# Patient Record
Sex: MALE | Race: BLACK OR AFRICAN AMERICAN | NOT HISPANIC OR LATINO | Employment: OTHER | URBAN - METROPOLITAN AREA
[De-identification: names, ages, dates, MRNs, and addresses within clinical notes are randomized per-mention and may not be internally consistent; named-entity substitution may affect disease eponyms.]

---

## 2018-05-17 ENCOUNTER — OFFICE VISIT (OUTPATIENT)
Dept: FAMILY MEDICINE | Facility: CLINIC | Age: 58
End: 2018-05-17
Payer: MEDICAID

## 2018-05-17 VITALS
HEART RATE: 73 BPM | RESPIRATION RATE: 16 BRPM | BODY MASS INDEX: 24.11 KG/M2 | WEIGHT: 178 LBS | OXYGEN SATURATION: 98 % | SYSTOLIC BLOOD PRESSURE: 112 MMHG | DIASTOLIC BLOOD PRESSURE: 74 MMHG | HEIGHT: 72 IN

## 2018-05-17 DIAGNOSIS — J30.9 ALLERGIC CONJUNCTIVITIS AND RHINITIS, BILATERAL: Primary | ICD-10-CM

## 2018-05-17 DIAGNOSIS — H10.13 ALLERGIC CONJUNCTIVITIS AND RHINITIS, BILATERAL: Primary | ICD-10-CM

## 2018-05-17 PROCEDURE — 99203 OFFICE O/P NEW LOW 30 MIN: CPT | Performed by: PHYSICIAN ASSISTANT

## 2018-05-17 RX ORDER — FLUTICASONE PROPIONATE 50 MCG
1-2 SPRAY, SUSPENSION (ML) NASAL DAILY
Qty: 1 BOTTLE | Refills: 3 | Status: SHIPPED | OUTPATIENT
Start: 2018-05-17

## 2018-05-17 RX ORDER — CETIRIZINE HYDROCHLORIDE 10 MG/1
10 TABLET ORAL DAILY
COMMUNITY

## 2018-05-17 ASSESSMENT — ENCOUNTER SYMPTOMS
ABDOMINAL PAIN: 0
SHORTNESS OF BREATH: 0
VOMITING: 0
HEADACHES: 0
COUGH: 1
FOCAL WEAKNESS: 0
DIARRHEA: 0
SORE THROAT: 1
EYE REDNESS: 1
CHILLS: 0
FEVER: 0
NAUSEA: 0

## 2018-05-17 NOTE — PROGRESS NOTES
HPI    SUBJECTIVE:   Jose L Poe is a 58 year old male who presents to clinic today for the following health issues:    ALLERGIES      Duration: 2 weeks    Description:   Nasal congestion: YES  Sneezing: YES  Red, itchy eyes: YES    Accompanying signs and symptoms: some cough & sore throat as well    History (similar episodes/allergy testing): Gets this every spring, worse this year    Precipitating or alleviating factors: None    Therapies tried and outcome: Claritin D and Zyrtec.  Help a little      Chart Review:  No flowsheet data found.  No flowsheet data found.    There is no problem list on file for this patient.    History reviewed. No pertinent surgical history.  Family History   Problem Relation Age of Onset     Family history unknown: Yes      Social History   Substance Use Topics     Smoking status: Never Smoker     Smokeless tobacco: Never Used     Alcohol use No        Problem list, Medication list, Allergies, Medical/Social/Surg hx reviewed in [employer], updated as appropriate.      Review of Systems   Constitutional: Negative for chills and fever.   HENT: Positive for congestion and sore throat.         Sneezing   Eyes: Positive for redness (& itching).   Respiratory: Positive for cough. Negative for shortness of breath.    Cardiovascular: Negative for chest pain.   Gastrointestinal: Negative for abdominal pain, diarrhea, nausea and vomiting.   Skin: Negative for rash.   Neurological: Negative for focal weakness and headaches.   All other systems reviewed and are negative.        Physical Exam   Constitutional: He is oriented to person, place, and time and well-developed, well-nourished, and in no distress.   HENT:   Head: Normocephalic and atraumatic.   Right Ear: Tympanic membrane, external ear and ear canal normal.   Left Ear: Tympanic membrane, external ear and ear canal normal.   Nose: Rhinorrhea present.   Mouth/Throat: Uvula is midline, oropharynx is clear and moist and mucous membranes are  "normal.   Eyes: EOM are normal. Pupils are equal, round, and reactive to light. Right conjunctiva is injected. Left conjunctiva is injected.   Cardiovascular: Normal rate, regular rhythm and normal heart sounds.    Pulmonary/Chest: Effort normal and breath sounds normal.   Musculoskeletal: Normal range of motion.   Neurological: He is alert and oriented to person, place, and time. Gait normal.   Skin: Skin is warm and dry.   Nursing note and vitals reviewed.    Vital Signs  /74  Pulse 73  Resp 16  Ht 5' 11.5\" (1.816 m)  Wt 178 lb (80.7 kg)  SpO2 98%  BMI 24.48 kg/m2   Body mass index is 24.48 kg/(m^2).    Diagnostic Test Results:  none     ASSESSMENT/PLAN:                                                        ICD-10-CM    1. Allergic conjunctivitis and rhinitis, bilateral H10.13 fluticasone (FLONASE) 50 MCG/ACT spray    J30.9 ketotifen (ZADITOR/REFRESH ANTI-ITCH) 0.025 % SOLN ophthalmic solution   PERRLA, EOMI. C/w allergies, Rx Flonase & Zaditor. Discontinue Zyrtec & Claritin.    I have discussed any lab or imaging results, the patient's diagnosis, and my plan of treatment with the patient and/or family. Patient is aware to come back in if with worsening symptoms or if no relief despite treatment plan.  Patient voiced understanding and had no further questions.       Follow Up: Data Unavailable    CANDY Emmanuel, PA-C  [facility]              "

## 2018-05-17 NOTE — MR AVS SNAPSHOT
"              After Visit Summary   2018    Jose L Poe    MRN: [id number]           Patient Information     Date Of Birth          1960        Visit Information        Provider Department      2018 8:00 AM Natalie De La Paz PA-C; [employer] [facility] [facility]        Today's Diagnoses     Allergic conjunctivitis and rhinitis, bilateral    -  1       Follow-ups after your visit        Who to contact     If you have questions or need follow up information about today's clinic visit or your schedule please contact [facility] directly at [phone].  Normal or non-critical lab and imaging results will be communicated to you by MyChart, letter or phone within 4 business days after the clinic has received the results. If you do not hear from us within 7 days, please contact the clinic through MyChart or phone. If you have a critical or abnormal lab result, we will notify you by phone as soon as possible.  Submit refill requests through [facility] or call your pharmacy and they will forward the refill request to us. Please allow 3 business days for your refill to be completed.          Additional Information About Your Visit        MyChart Information     [facility] lets you send messages to your doctor, view your test results, renew your prescriptions, schedule appointments and more. To sign up, go to [email or web address] . Click on \"Log in\" on the left side of the screen, which will take you to the Welcome page. Then click on \"Sign up Now\" on the right side of the page.     You will be asked to enter the access code listed below, as well as some personal information. Please follow the directions to create your username and password.     Your access code is: [email or web address]  Expires: 8/15/2018  8:46 AM     Your access code will  in 90 days. If you need help or a new code, please call your [facility] or " "[phone].        Care EveryWhere ID     This is your Care EveryWhere ID. This could be used by other organizations to access your [facility] medical records  [id number]        Your Vitals Were     Pulse Respirations Height Pulse Oximetry BMI (Body Mass Index)       73 16 5' 11.5\" (1.816 m) 98% 24.48 kg/m2        Blood Pressure from Last 3 Encounters:   05/17/18 112/74    Weight from Last 3 Encounters:   05/17/18 178 lb (80.7 kg)              Today, you had the following     No orders found for display         Today's Medication Changes          These changes are accurate as of 5/17/18  8:46 AM.  If you have any questions, ask your nurse or doctor.               Start taking these medicines.        Dose/Directions    fluticasone 50 MCG/ACT spray   Commonly known as:  FLONASE   Used for:  Allergic conjunctivitis and rhinitis, bilateral   Started by:  Natalie De La Paz PA-C        Dose:  1-2 spray   Spray 1-2 sprays into both nostrils daily   Quantity:  1 Bottle   Refills:  3       ketotifen 0.025 % Soln ophthalmic solution   Commonly known as:  ZADITOR/REFRESH ANTI-ITCH   Used for:  Allergic conjunctivitis and rhinitis, bilateral   Started by:  Natalie De La Paz PA-C        Dose:  1 drop   Place 1 drop into both eyes 2 times daily   Quantity:  1 Bottle   Refills:  3            Where to get your medicines      These medications were sent to [facility] [address] [place] [address] AT [address] [address]     Phone:  [phone]     fluticasone 50 MCG/ACT spray    ketotifen 0.025 % Soln ophthalmic solution                Primary Care Provider Office Phone # Fax #    [facility] [phone]       [address] [place] [address], [address]  [facility] [address]        Equal Access to Services     BUBBA WEIR AH: Jose Galeano, rosa bragg, joselo ray, " joselyn fajardorosibel sweeney'aan ah. So [facility] [phone].    ATENCIÓN: Si lesliela gamal, tiene a becerra disposición servicios gratuitos de asistencia lingüística. Marcel loera [phone].    We comply with applicable federal civil rights laws and [place] laws. We do not discriminate on the basis of race, color, national origin, age, disability, sex, sexual orientation, or gender identity.            Thank you!     Thank you for choosing [facility]  for your care. Our goal is always to provide you with excellent care. Hearing back from our patients is one way we can continue to improve our services. Please take a few minutes to complete the written survey that you may receive in the mail after your visit with us. Thank you!             Your Updated Medication List - Protect others around you: Learn how to safely use, store and throw away your medicines at www.disposemymeds.org.          This list is accurate as of 5/17/18  8:46 AM.  Always use your most recent med list.                   Brand Name Dispense Instructions for use Diagnosis    cetirizine 10 MG tablet    zyrTEC     Take 10 mg by mouth daily        fluticasone 50 MCG/ACT spray    FLONASE    1 Bottle    Spray 1-2 sprays into both nostrils daily    Allergic conjunctivitis and rhinitis, bilateral       ketotifen 0.025 % Soln ophthalmic solution    ZADITOR/REFRESH ANTI-ITCH    1 Bottle    Place 1 drop into both eyes 2 times daily    Allergic conjunctivitis and rhinitis, bilateral       loratadine-pseudoePHEDrine  MG per 24 hr tablet    CLARITIN-D 24-hour     Take 1 tablet by mouth daily

## 2018-05-17 NOTE — PROGRESS NOTES
"  SUBJECTIVE:   Jose L Poe is a 58 year old male who presents to clinic today for the following health issues:      ALLERGIES      Duration: 2 weels    Description:   Nasal congestion: YES  Sneezing: YES  Red, itchy eyes: YES    Accompanying signs and symptoms: none    History (similar episodes/allergy testing): None    Precipitating or alleviating factors: None    Therapies tried and outcome: Claritin D and Zyrtec.  Help a little      {additional problems for provider to add:076818}    Problem list and histories reviewed & adjusted, as indicated.  Additional history: {NONE - AS DOCUMENTED:671429::\"as documented\"}    {HIST REVIEW/ LINKS 2:235077}    Reviewed and updated as needed this visit by clinical staff       Reviewed and updated as needed this visit by Provider         {PROVIDER CHARTING PREFERENCE:870473}  "

## 2023-02-13 ENCOUNTER — HOSPITAL ENCOUNTER (EMERGENCY)
Facility: CLINIC | Age: 63
Discharge: HOME | End: 2023-02-13
Attending: EMERGENCY MEDICINE
Payer: MEDICAID

## 2023-02-13 VITALS
HEART RATE: 79 BPM | RESPIRATION RATE: 16 BRPM | DIASTOLIC BLOOD PRESSURE: 78 MMHG | SYSTOLIC BLOOD PRESSURE: 122 MMHG | TEMPERATURE: 98.4 F | OXYGEN SATURATION: 98 %

## 2023-02-13 RX ORDER — BACITRACIN ZINC 500 [USP'U]/G
OINTMENT TOPICAL ONCE
Status: DISCONTINUED | OUTPATIENT
Start: 2023-02-13 | End: 2023-02-13 | Stop reason: HOSPADM

## 2023-02-13 ASSESSMENT — ACTIVITIES OF DAILY LIVING (ADL): ADLS_ACUITY_SCORE: 35
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
## 2023-02-13 NOTE — ED NOTES
Pt unhappy about waiting. Writer went to go and try to clean blister, wrap and apply bacitracin. Pt refused, wants to talk to MD. Pt refused Tdap shot too. MD notified and will talk to pt.

## 2023-02-13 NOTE — ED NOTES
Pt continues to be unhappy. MD talked to pt. Pt continues to refuse care that the MD ordered. Pt refuses to sign the AMA form. Pt left ED.
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
